# Patient Record
Sex: FEMALE | Race: OTHER | NOT HISPANIC OR LATINO
[De-identification: names, ages, dates, MRNs, and addresses within clinical notes are randomized per-mention and may not be internally consistent; named-entity substitution may affect disease eponyms.]

---

## 2021-07-07 PROBLEM — Z00.00 ENCOUNTER FOR PREVENTIVE HEALTH EXAMINATION: Status: ACTIVE | Noted: 2021-07-07

## 2021-07-16 ENCOUNTER — APPOINTMENT (OUTPATIENT)
Dept: OTOLARYNGOLOGY | Facility: CLINIC | Age: 46
End: 2021-07-16
Payer: MEDICAID

## 2021-07-16 VITALS
SYSTOLIC BLOOD PRESSURE: 140 MMHG | HEART RATE: 73 BPM | BODY MASS INDEX: 34.66 KG/M2 | HEIGHT: 64 IN | DIASTOLIC BLOOD PRESSURE: 88 MMHG | WEIGHT: 203 LBS | TEMPERATURE: 97.1 F

## 2021-07-16 DIAGNOSIS — Z78.9 OTHER SPECIFIED HEALTH STATUS: ICD-10-CM

## 2021-07-16 DIAGNOSIS — Z87.891 PERSONAL HISTORY OF NICOTINE DEPENDENCE: ICD-10-CM

## 2021-07-16 PROCEDURE — 31231 NASAL ENDOSCOPY DX: CPT

## 2021-07-16 PROCEDURE — 99204 OFFICE O/P NEW MOD 45 MIN: CPT | Mod: 25

## 2021-07-16 RX ORDER — IBUPROFEN 200 MG/1
TABLET, COATED ORAL
Refills: 0 | Status: ACTIVE | COMMUNITY

## 2021-07-16 RX ORDER — METHYLPREDNISOLONE 4 MG/1
4 TABLET ORAL
Qty: 1 | Refills: 0 | Status: ACTIVE | COMMUNITY
Start: 2021-07-16 | End: 1900-01-01

## 2021-07-16 RX ORDER — SULFAMETHOXAZOLE AND TRIMETHOPRIM 800; 160 MG/1; MG/1
800-160 TABLET ORAL TWICE DAILY
Qty: 28 | Refills: 0 | Status: ACTIVE | COMMUNITY
Start: 2021-07-16 | End: 1900-01-01

## 2021-07-16 RX ORDER — FLUTICASONE PROPIONATE 93 UG/1
93 SPRAY, METERED NASAL
Qty: 1 | Refills: 3 | Status: ACTIVE | COMMUNITY
Start: 2021-07-16 | End: 1900-01-01

## 2021-07-16 RX ORDER — IBUPROFEN 600 MG/1
600 TABLET, FILM COATED ORAL
Refills: 0 | Status: ACTIVE | COMMUNITY

## 2021-07-16 NOTE — CONSULT LETTER
[Dear  ___] : Dear  [unfilled], [Courtesy Letter:] : I had the pleasure of seeing your patient, [unfilled], in my office today. [Consult Closing:] : Thank you very much for allowing me to participate in the care of this patient.  If you have any questions, please do not hesitate to contact me. [Sincerely,] : Sincerely, [FreeTextEntry3] : Paul Izaguirre MD\par Department of Otolaryngology - Head and Neck Surgery\par [facility] [DrAj  ___] : Dr. WALLIS

## 2021-07-16 NOTE — HISTORY OF PRESENT ILLNESS
[de-identified] : 45F here for initial evaluation.\par \par She c/o long standing nasal congestion/obstruction, thick mucus and nasal crusting. When these sx worsen, she gets facial pain and pressure. There is constant thick mucus which is usually white, but sometimes yellow/green.\par She frequently sticks paper clips in her nose to try and clean the crusting; this leads to nosebleeding. She has been on various nasal sprays without improvement; recently finished xhance without improvement. She also had two courses prednisone which may have temporarily helped sx.\par There are multiple allergies on testing; allergist wanted ENT evaluation prior to starting allergy shots. \par There is no known asthma, no known GERD.\par Former tobacco user. Smokes MJ. No intranasal drug use.\par \par CT Sinus 5/21/21 (I reviewed images):\par -severe midseptal deviation\par -nodular turbinate hypertrophy\par -thickening of nasal floor mucosa\par -polypoid middle meatal edema w mild/moderate maxillary, anterior ethmoid and frontal outflow mucosal edema\par \par ROS otherwise unremarkable.

## 2021-07-16 NOTE — ASSESSMENT
[FreeTextEntry1] : 45F here for initial evaluation. She c/o long standing nasal congestion/obstruction, thick mucus and nasal crusting. When these sx worsen, she gets facial pain and pressure. There is constant thick mucus which is usually white, but sometimes yellow/green. She frequently sticks paper clips in her nose to try and clean the crusting; this leads to nosebleeding. She has been on various nasal sprays without improvement; recently finished xhance without improvement; she also had two courses prednisone which may have temporarily helped sx. There are multiple allergies on testing; allergist wanted ENT evaluation prior to starting allergy shots. CT sinus shows severe left midseptal deviation with nodular turbinate hypertrophy and thickening of nasal mucosa; there is polypoid middle meatal edema w mild/moderate maxillary, anterior ethmoid and frontal outflow mucosal edema.\par On exam, nasal endoscopy shows nodular sinonasal mucosal edema over the nasal floor, septum and inferior turbinates w thick crusting and areas of mucosal ulceration. There is severe left midseptal deviation and middle meatal polypoid edema with thick mucus.\par Her nasal environment is unhealthy - she has a severe rhinitis which is ulcerative in certain areas w thick crusting. She does stick paper clips in her nose on a regular basis just to clean it, so this may be all traumatic, superimposed on allergic rhinitis. It also has an appearance like a sarcoid nose, but there is no history of this. There is additional left septal deflection and turbinate hypertrophy with paranasal sinus mucosal thickening involving anterior draining sinuses on CT. For now, will try to cool off any acute process; will start w course bactrim (to cover staph given crusting) w medrol taper. Saline throughout the day and will restart xhance. RTO 2-3 weeks. At that time, will reassess and likely recommend surgery (turbinate reduction, septoplasty and ESS [maxillary, anterior ethmoid, frontal]). This was all discussed at length and all questions answered.

## 2021-07-16 NOTE — PROCEDURE
[FreeTextEntry3] : Nasal Endoscopy:\par nodular sinonasal mucosal edema over nasal floor, septum and inferior turbinates w thick crusting and mucosal ulceration\par severe left midseptal deviation\par middle meatal polypoid edema\par thick mucus

## 2021-07-30 ENCOUNTER — APPOINTMENT (OUTPATIENT)
Dept: OTOLARYNGOLOGY | Facility: CLINIC | Age: 46
End: 2021-07-30
Payer: MEDICAID

## 2021-07-30 VITALS
DIASTOLIC BLOOD PRESSURE: 78 MMHG | HEART RATE: 92 BPM | HEIGHT: 64 IN | WEIGHT: 203 LBS | TEMPERATURE: 98.5 F | SYSTOLIC BLOOD PRESSURE: 130 MMHG | BODY MASS INDEX: 34.66 KG/M2

## 2021-07-30 DIAGNOSIS — J33.9 NASAL POLYP, UNSPECIFIED: ICD-10-CM

## 2021-07-30 PROCEDURE — 31231 NASAL ENDOSCOPY DX: CPT

## 2021-07-30 PROCEDURE — 99213 OFFICE O/P EST LOW 20 MIN: CPT | Mod: 25

## 2021-07-30 RX ORDER — BUDESONIDE 0.5 MG/2ML
0.5 INHALANT ORAL
Qty: 1 | Refills: 2 | Status: ACTIVE | COMMUNITY
Start: 2021-07-30 | End: 1900-01-01

## 2021-07-30 NOTE — CONSULT LETTER
[Dear  ___] : Dear  [unfilled], [Courtesy Letter:] : I had the pleasure of seeing your patient, [unfilled], in my office today. [Consult Closing:] : Thank you very much for allowing me to participate in the care of this patient.  If you have any questions, please do not hesitate to contact me. [Sincerely,] : Sincerely, [FreeTextEntry3] : Paul Izaguirre MD\par Department of Otolaryngology - Head and Neck Surgery\par [facility] [DrAj  ___] : Dr. WALLIS

## 2021-07-30 NOTE — ASSESSMENT
[FreeTextEntry1] : 45F here in followup. Since last seen, she feels >70% better. She finished the steroid taper and has several days left of the bactrim. However, she is still fairly symptomatic. She c/o long standing nasal congestion/obstruction, thick mucus and nasal crusting. When these sx worsen, she gets facial pain and pressure. There is constant thick mucus which is usually white, but sometimes yellow/green. She frequently sticks paper clips in her nose to try and clean the crusting; this leads to nosebleeding. She has been on various nasal sprays without improvement; recently finished xhance without improvement; she also had two courses prednisone which may have temporarily helped sx. There are multiple allergies on testing; allergist wanted ENT evaluation prior to starting allergy shots. CT sinus shows severe left midseptal deviation with nodular turbinate hypertrophy and thickening of nasal mucosa; there is polypoid middle meatal edema w mild/moderate maxillary, anterior ethmoid and frontal outflow mucosal edema.\par On exam, nasal endoscopy is only mildly improved and shows nodular sinonasal mucosal edema over the nasal floor, septum and inferior turbinates w thick crusting and areas of mucosal ulceration. There is severe left midseptal deviation and middle meatal polypoid edema with thick mucus.\par Her nasal environment is very unhealthy - she has a severe rhinitis which is ulcerative in certain areas w thick crusting. She does stick paper clips in her nose on a regular basis just to clean it, so this may be all traumatic, superimposed on allergic rhinitis. It also has an appearance of vasculitis - like a sarcoid nose, but there is no history of this. There is additional left septal deflection and turbinate hypertrophy with paranasal sinus mucosal thickening involving anterior draining sinuses on CT. At this point, she is somewhat improved w steroids and abx, I would start budesonide rinses and recommend surgery (turbinate reduction, septoplasty and ESS [maxillary, anterior ethmoid, frontal]). Prior to surgery, she will revisit allergist to start shots in the interim. This was all discussed at length and all questions answered. Plan for OR in the next 2-3 months.

## 2021-07-30 NOTE — HISTORY OF PRESENT ILLNESS
[General Appearance - Alert] : alert [General Appearance - In No Acute Distress] : in no acute distress [PERRL With Normal Accommodation] : pupils were equal in size, round, and reactive to light [Sclera] : the sclera and conjunctiva were normal [Extraocular Movements] : extraocular movements were intact [Outer Ear] : the ears and nose were normal in appearance [de-identified] : 45F here in followup.\par \par Since last seen, she feels >70% better. She finished the steroid taper and has several days left of the bactrim. However, she is still fairly symptomatic.\par She c/o long standing nasal congestion/obstruction, thick mucus and nasal crusting. When these sx worsen, she gets facial pain and pressure. There is constant thick mucus which is usually white, but sometimes yellow/green.\par She frequently sticks paper clips in her nose to try and clean the crusting; this leads to nosebleeding. She has been on various nasal sprays without improvement; recently finished xhance without improvement. She also had two courses prednisone which may have temporarily helped sx.\par There are multiple allergies on testing; allergist wanted ENT evaluation prior to starting allergy shots. \par There is no known asthma, no known GERD.\par Former tobacco user. Smokes MJ. No intranasal drug use.\par \par CT Sinus 5/21/21 (I reviewed images):\par -severe midseptal deviation\par -nodular turbinate hypertrophy\par -thickening of nasal floor mucosa\par -polypoid middle meatal edema w mild/moderate maxillary, anterior ethmoid and frontal outflow mucosal edema\par \par ROS otherwise unremarkable. [Oropharynx] : the oropharynx was normal [Neck Cervical Mass (___cm)] : no neck mass was observed [Neck Appearance] : the appearance of the neck was normal [Jugular Venous Distention Increased] : there was no jugular-venous distention [Thyroid Diffuse Enlargement] : the thyroid was not enlarged [Thyroid Nodule] : there were no palpable thyroid nodules [Auscultation Breath Sounds / Voice Sounds] : lungs were clear to auscultation bilaterally [Heart Rate And Rhythm] : heart rate was normal and rhythm regular [Heart Sounds] : normal S1 and S2 [Murmurs] : no murmurs [Heart Sounds Gallop] : no gallops [Heart Sounds Pericardial Friction Rub] : no pericardial rub [Full Pulse] : the pedal pulses are present [Edema] : there was no peripheral edema [Bowel Sounds] : normal bowel sounds [Abdomen Tenderness] : non-tender [Abdomen Soft] : soft [Abdomen Mass (___ Cm)] : no abdominal mass palpated [Cervical Lymph Nodes Enlarged Posterior Bilaterally] : posterior cervical [Cervical Lymph Nodes Enlarged Anterior Bilaterally] : anterior cervical [Supraclavicular Lymph Nodes Enlarged Bilaterally] : supraclavicular [Axillary Lymph Nodes Enlarged Bilaterally] : axillary [Femoral Lymph Nodes Enlarged Bilaterally] : femoral [Inguinal Lymph Nodes Enlarged Bilaterally] : inguinal [No CVA Tenderness] : no ~M costovertebral angle tenderness [No Spinal Tenderness] : no spinal tenderness [Abnormal Walk] : normal gait [Nail Clubbing] : no clubbing  or cyanosis of the fingernails [Musculoskeletal - Swelling] : no joint swelling seen [Motor Tone] : muscle strength and tone were normal [Skin Color & Pigmentation] : normal skin color and pigmentation [Skin Turgor] : normal skin turgor [] : no rash [Deep Tendon Reflexes (DTR)] : deep tendon reflexes were 2+ and symmetric [Sensation] : the sensory exam was normal to light touch and pinprick [No Focal Deficits] : no focal deficits

## 2021-10-10 ENCOUNTER — TRANSCRIPTION ENCOUNTER (OUTPATIENT)
Age: 46
End: 2021-10-10

## 2021-10-11 ENCOUNTER — APPOINTMENT (OUTPATIENT)
Dept: OTOLARYNGOLOGY | Facility: HOSPITAL | Age: 46
End: 2021-10-11

## 2021-10-11 ENCOUNTER — OUTPATIENT (OUTPATIENT)
Dept: OUTPATIENT SERVICES | Facility: HOSPITAL | Age: 46
LOS: 1 days | Discharge: HOME | End: 2021-10-11
Payer: MEDICAID

## 2021-10-11 ENCOUNTER — RESULT REVIEW (OUTPATIENT)
Age: 46
End: 2021-10-11

## 2021-10-11 LAB — SARS-COV-2 RNA SPEC QL NAA+PROBE: NEGATIVE — SIGNIFICANT CHANGE UP

## 2021-10-11 PROCEDURE — 30130 EXCISE INFERIOR TURBINATE: CPT | Mod: 50

## 2021-10-11 PROCEDURE — 31237 NSL/SINS NDSC SURG BX POLYPC: CPT | Mod: RT,59

## 2021-10-11 PROCEDURE — 30520 REPAIR OF NASAL SEPTUM: CPT

## 2021-10-11 PROCEDURE — 88304 TISSUE EXAM BY PATHOLOGIST: CPT | Mod: 26

## 2021-10-11 PROCEDURE — 61782 SCAN PROC CRANIAL EXTRA: CPT

## 2021-10-11 PROCEDURE — 31256 EXPLORATION MAXILLARY SINUS: CPT | Mod: 50

## 2021-10-11 PROCEDURE — 88300 SURGICAL PATH GROSS: CPT | Mod: 26,59

## 2021-10-11 PROCEDURE — 88305 TISSUE EXAM BY PATHOLOGIST: CPT | Mod: 26

## 2021-10-11 PROCEDURE — 31254 NSL/SINS NDSC W/PRTL ETHMDCT: CPT | Mod: 50

## 2021-10-11 RX ORDER — SODIUM CHLORIDE 0.65 %
0.65 AEROSOL, SPRAY (ML) NASAL
Qty: 2 | Refills: 5 | Status: ACTIVE | COMMUNITY
Start: 2021-10-11 | End: 1900-01-01

## 2021-10-11 RX ORDER — OXYCODONE AND ACETAMINOPHEN 5; 325 MG/1; MG/1
5-325 TABLET ORAL
Qty: 30 | Refills: 0 | Status: ACTIVE | COMMUNITY
Start: 2021-10-11 | End: 1900-01-01

## 2021-10-11 RX ORDER — SULFAMETHOXAZOLE AND TRIMETHOPRIM 800; 160 MG/1; MG/1
800-160 TABLET ORAL TWICE DAILY
Qty: 28 | Refills: 0 | Status: ACTIVE | COMMUNITY
Start: 2021-10-11 | End: 1900-01-01

## 2021-10-11 RX ORDER — PREDNISONE 10 MG/1
10 TABLET ORAL
Qty: 20 | Refills: 0 | Status: ACTIVE | COMMUNITY
Start: 2021-10-11 | End: 1900-01-01

## 2021-10-19 ENCOUNTER — APPOINTMENT (OUTPATIENT)
Dept: OTOLARYNGOLOGY | Facility: CLINIC | Age: 46
End: 2021-10-19
Payer: MEDICAID

## 2021-10-19 PROCEDURE — 99024 POSTOP FOLLOW-UP VISIT: CPT

## 2021-10-19 PROCEDURE — 31237 NSL/SINS NDSC SURG BX POLYPC: CPT | Mod: 58

## 2021-10-19 RX ORDER — MUPIROCIN 2 G/100G
2 CREAM TOPICAL
Qty: 1 | Refills: 5 | Status: ACTIVE | COMMUNITY
Start: 2021-10-19 | End: 1900-01-01

## 2021-10-19 NOTE — HISTORY OF PRESENT ILLNESS
[de-identified] : 45F here in first postoperative visit SMR/ESS (maxillary, anterior ethmoid) 10/11/21 for chronic sinusitis, erosive rhinitis and septal deviation. Intraoperatively, anterior nasal cavity mucosa w granulation and ulceration.\par \par She is doing well since surgery. Pain is controlled. She took the medication as prescribed. \par \par Pathology:\par pending\par \par ROS otherwise unremarkable.

## 2021-10-19 NOTE — PROCEDURE
[FreeTextEntry3] : doyles and minipropel removed\par \par Nasal Endoscopy:\par crusting and debris removed \par septum intact and midline\par middle meati patent, no mucopus\par nasal airways widely patent

## 2021-10-19 NOTE — CONSULT LETTER
[Dear  ___] : Dear  [unfilled], [Courtesy Letter:] : I had the pleasure of seeing your patient, [unfilled], in my office today. [Consult Closing:] : Thank you very much for allowing me to participate in the care of this patient.  If you have any questions, please do not hesitate to contact me. [Sincerely,] : Sincerely, [DrAj  ___] : Dr. WALLIS [FreeTextEntry3] : Paul Izaguirre MD\par Department of Otolaryngology - Head and Neck Surgery\par [facility]

## 2021-10-19 NOTE — ASSESSMENT
[FreeTextEntry1] : 45F here in first postoperative visit SMR/ESS (maxillary, anterior ethmoid) 10/11/21 for chronic sinusitis, erosive rhinitis and septal deviation. Intraoperatively, anterior nasal cavity mucosa w granulation and ulceration. She is doing well since surgery. Pain is controlled. She took the medication as prescribed. On exam, nasal endoscopy shows well healing expected postoperative changes with an intact and midline septum, patent middle meati and patent nasal airways; she felt much better after debridement.\par She is doing well. To start budesonide rinses and will add mupirocin to it given the preop crusting. Will f/u pathology in the interim. RTO 4 weeks in routine followup. \par

## 2021-10-22 LAB — SURGICAL PATHOLOGY STUDY: SIGNIFICANT CHANGE UP

## 2021-11-30 ENCOUNTER — APPOINTMENT (OUTPATIENT)
Dept: OTOLARYNGOLOGY | Facility: CLINIC | Age: 46
End: 2021-11-30
Payer: MEDICAID

## 2021-11-30 PROCEDURE — 31231 NASAL ENDOSCOPY DX: CPT | Mod: 58

## 2021-11-30 PROCEDURE — 99213 OFFICE O/P EST LOW 20 MIN: CPT | Mod: 25

## 2021-11-30 NOTE — CONSULT LETTER
[Dear  ___] : Dear  [unfilled], [Courtesy Letter:] : I had the pleasure of seeing your patient, [unfilled], in my office today. [Consult Closing:] : Thank you very much for allowing me to participate in the care of this patient.  If you have any questions, please do not hesitate to contact me. [Sincerely,] : Sincerely, [FreeTextEntry3] : Paul Izaguirre MD\par Department of Otolaryngology - Head and Neck Surgery\par [facility] [DrAj  ___] : Dr. WALLIS

## 2021-11-30 NOTE — PROCEDURE
[FreeTextEntry3] : Nasal Endoscopy:\par mild crusting and thick clear mucus removed \par septum intact and midline, areas of mild granulation\par middle meati patent, no mucopus, some debris suctioned from right middle meatus\par nasal airways widely patent

## 2021-11-30 NOTE — HISTORY OF PRESENT ILLNESS
[de-identified] : 45F here in second postoperative visit SMR/ESS (maxillary, anterior ethmoid) 10/11/21 for chronic sinusitis, erosive rhinitis and septal deviation. Intraoperatively, anterior nasal cavity mucosa w granulation and ulceration.\par \par She is doing very well since last seen. She is breathing great, there is minimal mouth discomfort. She is using budesonide/mupirocin rinses (budesonide BID and mupirocin qD).\par \par Pathology:\par 1. Nasal septum:\par - Squamous mucosa with acute and chronic inflammation, and reactive changes.\par - No fungal microorganisms identified on GMS stain.\par - No granuloma identified.\par - No definitive evidence of vasculitis identified.\par 2. Bilateral sinus shavings:\par - Fragments of sinonasal mucosa with chronic inflammation and reactive changes.\par 3. Left sinus contents:\par - Fragments of sinonasal mucosa with chronic inflammation.\par 4. Right sinus contents:\par - Fragments of sinonasal mucosa with chronic inflammation.\par 5. Right maxillary sinus:\par - Fragments of sinonasal mucosa with chronic inflammation.\par 6. Left inferior turbinate:\par - Sinonasal mucosa with acute and chronic inflammation, and reactive changes.\par - No fungal microorganisms identified on GMS stain.\par 7. Right inferior turbinate:\par - Sinonasal mucosa with acute and chronic inflammation, and reactive changes.\par 8. Nasal septum:\par - Fragments of bone and cartilage, for gross analysis only.\par \par ROS otherwise unremarkable.

## 2021-11-30 NOTE — ASSESSMENT
[FreeTextEntry1] : 45F here in second postoperative visit SMR/ESS (maxillary, anterior ethmoid) 10/11/21 for chronic sinusitis, erosive rhinitis and septal deviation. Intraoperatively, anterior nasal cavity mucosa w granulation and ulceration. She is doing very well since last seen. She is breathing great, there is minimal mouth discomfort. She is using budesonide/mupirocin rinses (budesonide BID and mupirocin qD). On exam, nasal endoscopy shows well healing postoperative changes with an intact and midline septum, patent middle meati and patent nasal airways; she felt much better after debridement.\par She is doing very well and is healing quite well. Continue BID budesonide/mupirocin rinses. RTO 4 weeks in routine followup; will need more frequent debridements in her given degree of inflammatory change and granulation.\par

## 2022-01-04 ENCOUNTER — APPOINTMENT (OUTPATIENT)
Dept: OTOLARYNGOLOGY | Facility: CLINIC | Age: 47
End: 2022-01-04
Payer: MEDICAID

## 2022-01-04 PROCEDURE — 31231 NASAL ENDOSCOPY DX: CPT | Mod: 58

## 2022-01-04 PROCEDURE — 99213 OFFICE O/P EST LOW 20 MIN: CPT | Mod: 25

## 2022-01-04 NOTE — CONSULT LETTER
[Dear  ___] : Dear  [unfilled], [Courtesy Letter:] : I had the pleasure of seeing your patient, [unfilled], in my office today. [Consult Closing:] : Thank you very much for allowing me to participate in the care of this patient.  If you have any questions, please do not hesitate to contact me. [Sincerely,] : Sincerely, [FreeTextEntry3] : Paul Izaguirre MD\par Department of Otolaryngology - Head and Neck Surgery\par [facility] [DrAj  ___] : Dr. WALLIS

## 2022-01-04 NOTE — HISTORY OF PRESENT ILLNESS
[de-identified] : 46F here in routine postoperative visit SMR/ESS (maxillary, anterior ethmoid) 10/11/21 for chronic sinusitis, erosive rhinitis and septal deviation. Intraoperatively, anterior nasal cavity mucosa w granulation and ulceration.\par \par She is doing very well since last seen. She is breathing great, there is no nasal or mouth discomfort. She is using budesonide/mupirocin rinses BID.\par \par ROS otherwise unremarkable.

## 2022-01-04 NOTE — PROCEDURE
[FreeTextEntry3] : Nasal Endoscopy:\par mild crusting removed \par septum intact and midline, areas of mucosal nodularity and irregularity, no granulation\par middle meati patent, no mucopus, some debris suctioned from b/l right middle meatus\par nasal airways widely patent

## 2022-01-04 NOTE — ASSESSMENT
[FreeTextEntry1] : 46F here in routine postoperative visit SMR/ESS (maxillary, anterior ethmoid) 10/11/21 for chronic sinusitis, erosive rhinitis and septal deviation. Intraoperatively, anterior nasal cavity mucosa w granulation and ulceration. She is doing very well since last seen. She is breathing great, there is no nasal or mouth discomfort. She is using budesonide/mupirocin rinses BID. On exam, nasal endoscopy shows well healed postoperative changes with an intact and midline septum, patent middle meati and patent nasal airways.\par She is doing very well and continues to heal quite well. Continue BID budesonide/mupirocin rinses. RTO 2 months in routine followup; will need more frequent debridements in her given degree of inflammatory change and granulation.\par

## 2022-01-05 ENCOUNTER — RX RENEWAL (OUTPATIENT)
Age: 47
End: 2022-01-05

## 2022-01-05 RX ORDER — BUDESONIDE 0.5 MG/2ML
0.5 INHALANT ORAL
Qty: 60 | Refills: 1 | Status: ACTIVE | COMMUNITY
Start: 2021-10-19 | End: 1900-01-01

## 2022-03-08 ENCOUNTER — APPOINTMENT (OUTPATIENT)
Dept: OTOLARYNGOLOGY | Facility: CLINIC | Age: 47
End: 2022-03-08
Payer: MEDICAID

## 2022-03-08 VITALS
HEART RATE: 81 BPM | BODY MASS INDEX: 33.12 KG/M2 | HEIGHT: 64 IN | WEIGHT: 194 LBS | SYSTOLIC BLOOD PRESSURE: 155 MMHG | TEMPERATURE: 96.5 F | DIASTOLIC BLOOD PRESSURE: 95 MMHG

## 2022-03-08 DIAGNOSIS — R09.81 NASAL CONGESTION: ICD-10-CM

## 2022-03-08 DIAGNOSIS — J34.89 OTHER SPECIFIED DISORDERS OF NOSE AND NASAL SINUSES: ICD-10-CM

## 2022-03-08 DIAGNOSIS — J31.0 CHRONIC RHINITIS: ICD-10-CM

## 2022-03-08 PROCEDURE — 99213 OFFICE O/P EST LOW 20 MIN: CPT | Mod: 25

## 2022-03-08 PROCEDURE — 31231 NASAL ENDOSCOPY DX: CPT

## 2022-03-08 NOTE — HISTORY OF PRESENT ILLNESS
[de-identified] : 46F here in routine followup.\par \par She is s/p SMR/ESS (maxillary, anterior ethmoid) 10/11/21 for chronic sinusitis, erosive rhinitis and septal deviation. Intraoperatively, anterior nasal cavity mucosa w granulation and ulceration.\par \par She is doing very well since last seen. She is breathing well, there is no nasal or mouth discomfort. She is using budesonide/mupirocin rinses BID. There is occasional crusting, but remains improved.\par \par ROS otherwise unremarkable.

## 2022-03-08 NOTE — ASSESSMENT
[FreeTextEntry1] : 46F here in routine postoperative visit SMR/ESS (maxillary, anterior ethmoid) 10/11/21 for chronic sinusitis, erosive rhinitis and septal deviation. Intraoperatively, anterior nasal cavity mucosa w granulation and ulceration. She is doing very well since last seen. She is breathing great, there is no nasal or mouth discomfort. She is using budesonide/mupirocin rinses BID. There is occasional crusting, but remains improved. On exam, nasal endoscopy shows well healed postoperative changes with an intact and midline septum, patent middle meati and patent nasal airways.\par She is doing very well. Continue BID budesonide/mupirocin rinses. RTO 2 months in routine followup; she needs frequent debridements given degree of erosive mucosal rhinitis, inflammatory change and granulation.\par

## 2022-03-08 NOTE — PROCEDURE
[FreeTextEntry3] : Nasal Endoscopy:\par mild crusting removed (R>L)\par septum intact and midline, areas of mucosal nodularity and irregularity w granulation\par middle meati patent, no mucopus, some debris suctioned from b/l (right>left) middle meatus\par nasal airways widely patent

## 2022-03-08 NOTE — CONSULT LETTER
[Dear  ___] : Dear  [unfilled], [Courtesy Letter:] : I had the pleasure of seeing your patient, [unfilled], in my office today. [Consult Closing:] : Thank you very much for allowing me to participate in the care of this patient.  If you have any questions, please do not hesitate to contact me. [Sincerely,] : Sincerely, [FreeTextEntry3] : Paul Izaguirre MD\par Department of Otolaryngology - Head and Neck Surgery\par [facility] [DrAj  ___] : Dr. WALLIS

## 2022-05-10 ENCOUNTER — APPOINTMENT (OUTPATIENT)
Dept: OTOLARYNGOLOGY | Facility: CLINIC | Age: 47
End: 2022-05-10